# Patient Record
Sex: MALE | Race: WHITE
[De-identification: names, ages, dates, MRNs, and addresses within clinical notes are randomized per-mention and may not be internally consistent; named-entity substitution may affect disease eponyms.]

---

## 2019-01-25 ENCOUNTER — HOSPITAL ENCOUNTER (EMERGENCY)
Dept: HOSPITAL 61 - ER | Age: 34
Discharge: HOME | End: 2019-01-25
Payer: SELF-PAY

## 2019-01-25 VITALS — BODY MASS INDEX: 20.46 KG/M2 | WEIGHT: 135 LBS | HEIGHT: 68 IN

## 2019-01-25 VITALS — SYSTOLIC BLOOD PRESSURE: 128 MMHG | DIASTOLIC BLOOD PRESSURE: 79 MMHG
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
## 2019-01-25 DIAGNOSIS — S49.81XA: Primary | ICD-10-CM
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
## 2019-01-25 DIAGNOSIS — M54.2: ICD-10-CM

## 2019-01-25 DIAGNOSIS — F17.210: ICD-10-CM
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
## 2019-01-25 PROCEDURE — 72125 CT NECK SPINE W/O DYE: CPT

## 2019-01-25 PROCEDURE — 99284 EMERGENCY DEPT VISIT MOD MDM: CPT

## 2019-01-25 PROCEDURE — 73030 X-RAY EXAM OF SHOULDER: CPT

## 2019-01-25 NOTE — RAD
Right shoulder, 3 views, 1/25/2019:

 

HISTORY: Fall, pain

 

No acute fracture or dislocation is identified. Mild distal clavicular 

deformity is compatible with an old healed fracture. There is mild 

spurring along the glenoid rim. There is a suggestion of an old impaction 

fracture along the lateral margin of the humeral head.

 

IMPRESSION: No acute bony abnormality is detected.

 

Electronically signed by: Rick Moritz, MD (1/25/2019 7:35 AM) [facility]

## 2019-01-25 NOTE — RAD
CT of the cervical spine without contrast, 1/25/2019:

 

HISTORY: Fall, neck pain

 

Noncontrast scans were obtained with multiplanar reconstructions produced.

There are mild scattered spurs in the mid and lower cervical spine, most 

prominent at C5-6 and C4-5. There are small posterior disc protrusions at 

the midline at C5-6 and to lesser degree at C4-5. There is generalized 

thickening of the posterior longitudinal ligament. The combination of 

findings is causing borderline narrowing of the central spinal canal, 

greatest at C5-6. No acute fracture or dislocation is identified. The 

prevertebral soft tissues are unremarkable.

 

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

the right lobe.

 

 

IMPRESSION:

1. Scattered degenerative changes, greatest at the C5-6 level.

2. No acute bony abnormality is detected.

 

Electronically signed by: Rick Moritz, MD (1/25/2019 7:42 AM) [facility]

## 2019-01-25 NOTE — PHYS DOC
Past Medical History


Past Medical History:  No Pertinent History


Past Surgical History:  No Surgical History


Smoking:  Cigarettes


Alcohol Use:  None


Drug Use:  None





Adult General


Chief Complaint


Chief Complaint:  SHOULDER INJURY





HPI


HPI


33-year-old male presenting the emergency department today after falling down 

the stairs. He reports falling down about 12 stairs and injured his right 

shoulder. He slipped on a lawrence toy and slid down the stairs. He believes it 

was dislocated and believes that he reduced it. His pain continues. This was 

within the last 4-6 hours. He denies head injury or loss of consciousness. 

Describes the pain is sharp shooting nonradiating. He denies any other injuries.





Review of systems is negative for chest pain shortness of breath abdominal pain 

vomiting or any other extremity injuries. All other review of systems is 

negative unless otherwise noted in history of present illness.





ED course: 33-year-old male presenting with right shoulder pain after fall down 

multiple stairs associated with neck pain. No obvious trauma on secondary 

survey. No ecchymosis lacerations abrasions. Nontender midline cervical spine 

however given mechanism we will perform CAT scan along with a shoulder x-ray. 

Imaging not suggestive of acute pathology. Radiologist reports suggest location 

of an old impaction fracture along the lateral margin of the humeral head which 

I communicated the patient. We'll place him in a sling and refer him to his PCP 

for further evaluation care. This pain continues he will need an MRI and 

orthopedic surgery consultation within the next 7-15 days.The patient has been 

examined and was not found to have an emergency medical condition. The patient 

was then discharged home in stable condition to follow up with their primary 

care physician over the next 1-2 days. They were to return if their symptoms 

worsened or if they were concerned for any reason.  They were also instructed 

to return to the emergency department if they were unable to get the 

recommended and appropriate follow-up.  Face-to-face discharge instructions and 

return precautions were given. Patient's questions were answered to their 

satisfaction. Patient is comfortable with plan.





Review of Systems


Review of Systems


SEE ABOVE.





Allergies


Allergies





Allergies








Coded Allergies Type Severity Reaction Last Updated Verified


 


  No Known Drug Allergies    1/25/19 No











Physical Exam


Physical Exam


SEE ABOVE





Constitutional: Well developed, well nourished, no acute distress, non-toxic 

appearance. []


HENT: Normocephalic, atraumatic, bilateral external ears normal, oropharynx 

moist, no oral exudates, nose normal. []


Eyes: PERRLA, EOMI, conjunctiva normal, no discharge. [] 


Neck: Normal range of motion, no tenderness, supple, no stridor. No tenderness 

midline.


Cardiovascular:Heart rate regular rhythm, no murmur []


Lungs & Thorax:  Bilateral breath sounds clear to auscultation []


Abdomen: Bowel sounds normal, soft, no tenderness, no masses, no pulsatile 

masses. [] 


Skin: Warm, dry, no erythema, no rash. [] 


Back: No tenderness, no CVA tenderness. [] Atraumatic. No ecchymosis 

lacerations or abrasions.


Extremities: The patient's right shoulder has mild pain with passive range of 

motion. Nontender clavicle. Nontender acromioclavicular joint. Neurovascularly 

intact distally with using Refill palpable pulse. Nontender elbow and wrist 

distally. The remainder the extremities are atraumatic and nontender.


Neurologic: Alert and oriented X 3, normal motor function, normal sensory 

function, no focal deficits noted. []


Psychologic: Affect normal, judgement normal, mood normal. []





Current Patient Data


Vital Signs





 Vital Signs








  Date Time  Temp Pulse Resp B/P (MAP) Pulse Ox O2 Delivery O2 Flow Rate FiO2


 


1/25/19 06:40 98.1 58 20 136/81 (99) 98 Room Air  





 98.1       











EKG


EKG


[]





Radiology/Procedures


Radiology/Procedures


[]





Course & Med Decision Making


Course & Med Decision Making


Pertinent Labs and Imaging studies reviewed. (See chart for details)





[]





Dragon Disclaimer


Dragon Disclaimer


This electronic medical record was generated, in whole or in part, using a 

voice recognition dictation system.





Departure


Departure


Impression:  


 Primary Impression:  


 Right shoulder pain


Disposition:  01 HOME, SELF-CARE


Condition:  STABLE


Referrals:  


NO PCP (PCP)








CHAPARRITA QUIROZ MD


Patient Instructions:  Shoulder Pain, Easy-to-Read





Additional Instructions:  


Thank you for allowing us to participate in your care today.





Return to the emergency department you have any new or worsening symptoms, or 

if you are concerned for any reason. Return to emergency department if you have 

any  new or concerning symptoms including but not limited to fever, chills, 

nausea, vomiting, intractable pain, any new rashes, chest pain, shortness of air

, uncontrolled bleeding, difficulty breathing, and/or vision loss.





Follow up with your primary care physician within 1-2 days.  Call your Primary 

Doctor tomorrow and inform them of your visit today.  If you do not have a 

primary care provider we are happy to provide you with a list of our primary 

care providers contact information. 





This condition should be evaluated by your primary care physician and any 

recommended consulting services for continued management within 2 days after 

discharge. If at any time, you are having difficulty getting into your primary 

care doctor or a specialist, return to the emergency department.











LUCINDA ANDERSON MD Jan 25, 2019 07:03